# Patient Record
Sex: FEMALE | Race: WHITE | Employment: OTHER | URBAN - METROPOLITAN AREA
[De-identification: names, ages, dates, MRNs, and addresses within clinical notes are randomized per-mention and may not be internally consistent; named-entity substitution may affect disease eponyms.]

---

## 2022-09-23 RX ORDER — ATORVASTATIN CALCIUM 40 MG/1
40 TABLET, FILM COATED ORAL DAILY
COMMUNITY
Start: 2022-03-21

## 2022-09-23 RX ORDER — INSULIN DEGLUDEC 200 U/ML
60 INJECTION, SOLUTION SUBCUTANEOUS DAILY
COMMUNITY
Start: 2022-08-16 | End: 2022-11-14

## 2022-09-23 RX ORDER — VALSARTAN 80 MG/1
TABLET ORAL
COMMUNITY
Start: 2022-09-07 | End: 2022-12-06

## 2022-09-23 RX ORDER — METFORMIN HYDROCHLORIDE 500 MG/1
1000 TABLET, EXTENDED RELEASE ORAL 2 TIMES DAILY
COMMUNITY
Start: 2022-03-14 | End: 2023-03-14

## 2022-09-23 RX ORDER — ASPIRIN 81 MG/1
81 TABLET, CHEWABLE ORAL DAILY
COMMUNITY
Start: 2021-12-30 | End: 2022-12-30

## 2022-09-28 ENCOUNTER — ANESTHESIA EVENT (OUTPATIENT)
Dept: OPERATING ROOM | Age: 77
End: 2022-09-28
Payer: MEDICARE

## 2022-09-28 ENCOUNTER — HOSPITAL ENCOUNTER (OUTPATIENT)
Age: 77
Setting detail: OUTPATIENT SURGERY
Discharge: HOME | End: 2022-09-28
Attending: OPHTHALMOLOGY | Admitting: OPHTHALMOLOGY
Payer: MEDICARE

## 2022-09-28 ENCOUNTER — ANESTHESIA (OUTPATIENT)
Dept: OPERATING ROOM | Age: 77
End: 2022-09-28
Payer: MEDICARE

## 2022-09-28 VITALS
SYSTOLIC BLOOD PRESSURE: 125 MMHG | TEMPERATURE: 97.2 F | HEART RATE: 60 BPM | WEIGHT: 177 LBS | HEIGHT: 67 IN | OXYGEN SATURATION: 97 % | RESPIRATION RATE: 18 BRPM | DIASTOLIC BLOOD PRESSURE: 84 MMHG | BODY MASS INDEX: 27.78 KG/M2

## 2022-09-28 LAB
EKG ATRIAL RATE: 63 BPM
EKG P AXIS: 60 DEGREES
EKG P-R INTERVAL: 146 MS
EKG Q-T INTERVAL: 408 MS
EKG QRS DURATION: 70 MS
EKG QTC CALCULATION (BAZETT): 417 MS
EKG R AXIS: 29 DEGREES
EKG T AXIS: 49 DEGREES
EKG VENTRICULAR RATE: 63 BPM
GFR NON-AFRICAN AMERICAN: 47 ML/MIN
GFR SERPL CREATININE-BSD FRML MDRD: 57 ML/MIN
GFR SERPL CREATININE-BSD FRML MDRD: ABNORMAL ML/MIN/{1.73_M2}
GLUCOSE BLD-MCNC: 103 MG/DL (ref 74–100)
POC BUN: 29 MG/DL (ref 8–26)
POC CREATININE: 1.12 MG/DL (ref 0.51–1.19)

## 2022-09-28 PROCEDURE — 6360000002 HC RX W HCPCS: Performed by: OPHTHALMOLOGY

## 2022-09-28 PROCEDURE — 6370000000 HC RX 637 (ALT 250 FOR IP): Performed by: OPHTHALMOLOGY

## 2022-09-28 PROCEDURE — 3600000004 HC SURGERY LEVEL 4 BASE: Performed by: OPHTHALMOLOGY

## 2022-09-28 PROCEDURE — 82565 ASSAY OF CREATININE: CPT

## 2022-09-28 PROCEDURE — 2500000003 HC RX 250 WO HCPCS

## 2022-09-28 PROCEDURE — 7100000040 HC SPAR PHASE II RECOVERY - FIRST 15 MIN: Performed by: OPHTHALMOLOGY

## 2022-09-28 PROCEDURE — 2709999900 HC NON-CHARGEABLE SUPPLY: Performed by: OPHTHALMOLOGY

## 2022-09-28 PROCEDURE — 3700000001 HC ADD 15 MINUTES (ANESTHESIA): Performed by: OPHTHALMOLOGY

## 2022-09-28 PROCEDURE — 2720000010 HC SURG SUPPLY STERILE: Performed by: OPHTHALMOLOGY

## 2022-09-28 PROCEDURE — 3600000014 HC SURGERY LEVEL 4 ADDTL 15MIN: Performed by: OPHTHALMOLOGY

## 2022-09-28 PROCEDURE — 2580000003 HC RX 258: Performed by: OPHTHALMOLOGY

## 2022-09-28 PROCEDURE — 93010 ELECTROCARDIOGRAM REPORT: CPT | Performed by: INTERNAL MEDICINE

## 2022-09-28 PROCEDURE — 2580000003 HC RX 258

## 2022-09-28 PROCEDURE — 3700000000 HC ANESTHESIA ATTENDED CARE: Performed by: OPHTHALMOLOGY

## 2022-09-28 PROCEDURE — 84520 ASSAY OF UREA NITROGEN: CPT

## 2022-09-28 PROCEDURE — 6360000002 HC RX W HCPCS

## 2022-09-28 PROCEDURE — 93005 ELECTROCARDIOGRAM TRACING: CPT | Performed by: STUDENT IN AN ORGANIZED HEALTH CARE EDUCATION/TRAINING PROGRAM

## 2022-09-28 PROCEDURE — 2500000003 HC RX 250 WO HCPCS: Performed by: OPHTHALMOLOGY

## 2022-09-28 PROCEDURE — 82947 ASSAY GLUCOSE BLOOD QUANT: CPT

## 2022-09-28 PROCEDURE — 7100000041 HC SPAR PHASE II RECOVERY - ADDTL 15 MIN: Performed by: OPHTHALMOLOGY

## 2022-09-28 PROCEDURE — 2580000003 HC RX 258: Performed by: ANESTHESIOLOGY

## 2022-09-28 RX ORDER — ONDANSETRON 2 MG/ML
4 INJECTION INTRAMUSCULAR; INTRAVENOUS
Status: DISCONTINUED | OUTPATIENT
Start: 2022-09-28 | End: 2022-09-28 | Stop reason: HOSPADM

## 2022-09-28 RX ORDER — SODIUM CHLORIDE, SODIUM LACTATE, POTASSIUM CHLORIDE, CALCIUM CHLORIDE 600; 310; 30; 20 MG/100ML; MG/100ML; MG/100ML; MG/100ML
INJECTION, SOLUTION INTRAVENOUS CONTINUOUS
Status: DISCONTINUED | OUTPATIENT
Start: 2022-09-28 | End: 2022-09-28 | Stop reason: HOSPADM

## 2022-09-28 RX ORDER — FENTANYL CITRATE 50 UG/ML
25 INJECTION, SOLUTION INTRAMUSCULAR; INTRAVENOUS EVERY 5 MIN PRN
Status: DISCONTINUED | OUTPATIENT
Start: 2022-09-28 | End: 2022-09-28 | Stop reason: HOSPADM

## 2022-09-28 RX ORDER — SODIUM CHLORIDE 0.9 % (FLUSH) 0.9 %
SYRINGE (ML) INJECTION PRN
Status: DISCONTINUED | OUTPATIENT
Start: 2022-09-28 | End: 2022-09-28 | Stop reason: ALTCHOICE

## 2022-09-28 RX ORDER — MIDAZOLAM HYDROCHLORIDE 2 MG/2ML
1 INJECTION, SOLUTION INTRAMUSCULAR; INTRAVENOUS EVERY 10 MIN PRN
Status: DISCONTINUED | OUTPATIENT
Start: 2022-09-28 | End: 2022-09-28 | Stop reason: HOSPADM

## 2022-09-28 RX ORDER — NEOMYCIN SULFATE, POLYMYXIN B SULFATE, AND DEXAMETHASONE 3.5; 10000; 1 MG/G; [USP'U]/G; MG/G
OINTMENT OPHTHALMIC PRN
Status: DISCONTINUED | OUTPATIENT
Start: 2022-09-28 | End: 2022-09-28 | Stop reason: ALTCHOICE

## 2022-09-28 RX ORDER — LABETALOL HYDROCHLORIDE 5 MG/ML
INJECTION, SOLUTION INTRAVENOUS PRN
Status: DISCONTINUED | OUTPATIENT
Start: 2022-09-28 | End: 2022-09-28

## 2022-09-28 RX ORDER — PHENYLEPHRINE HYDROCHLORIDE 100 MG/ML
1 SOLUTION/ DROPS OPHTHALMIC
Status: COMPLETED | OUTPATIENT
Start: 2022-09-28 | End: 2022-09-28

## 2022-09-28 RX ORDER — SODIUM CHLORIDE 0.9 % (FLUSH) 0.9 %
5-40 SYRINGE (ML) INJECTION EVERY 12 HOURS SCHEDULED
Status: DISCONTINUED | OUTPATIENT
Start: 2022-09-28 | End: 2022-09-28 | Stop reason: HOSPADM

## 2022-09-28 RX ORDER — CYCLOPENTOLATE HYDROCHLORIDE 10 MG/ML
1 SOLUTION/ DROPS OPHTHALMIC
Status: COMPLETED | OUTPATIENT
Start: 2022-09-28 | End: 2022-09-28

## 2022-09-28 RX ORDER — LIDOCAINE HYDROCHLORIDE 10 MG/ML
1 INJECTION, SOLUTION INFILTRATION; PERINEURAL
Status: DISCONTINUED | OUTPATIENT
Start: 2022-09-28 | End: 2022-09-28 | Stop reason: HOSPADM

## 2022-09-28 RX ORDER — FENTANYL CITRATE 50 UG/ML
INJECTION, SOLUTION INTRAMUSCULAR; INTRAVENOUS PRN
Status: DISCONTINUED | OUTPATIENT
Start: 2022-09-28 | End: 2022-09-28 | Stop reason: SDUPTHER

## 2022-09-28 RX ORDER — FENTANYL CITRATE 50 UG/ML
25 INJECTION, SOLUTION INTRAMUSCULAR; INTRAVENOUS
Status: DISCONTINUED | OUTPATIENT
Start: 2022-09-28 | End: 2022-09-28 | Stop reason: HOSPADM

## 2022-09-28 RX ORDER — FENTANYL CITRATE 50 UG/ML
50 INJECTION, SOLUTION INTRAMUSCULAR; INTRAVENOUS EVERY 5 MIN PRN
Status: DISCONTINUED | OUTPATIENT
Start: 2022-09-28 | End: 2022-09-28 | Stop reason: HOSPADM

## 2022-09-28 RX ORDER — DIPHENHYDRAMINE HYDROCHLORIDE 50 MG/ML
12.5 INJECTION INTRAMUSCULAR; INTRAVENOUS
Status: DISCONTINUED | OUTPATIENT
Start: 2022-09-28 | End: 2022-09-28 | Stop reason: HOSPADM

## 2022-09-28 RX ORDER — OFLOXACIN 3 MG/ML
1 SOLUTION/ DROPS OPHTHALMIC
Status: COMPLETED | OUTPATIENT
Start: 2022-09-28 | End: 2022-09-28

## 2022-09-28 RX ORDER — VANCOMYCIN HYDROCHLORIDE 500 MG/10ML
INJECTION, POWDER, LYOPHILIZED, FOR SOLUTION INTRAVENOUS PRN
Status: DISCONTINUED | OUTPATIENT
Start: 2022-09-28 | End: 2022-09-28 | Stop reason: ALTCHOICE

## 2022-09-28 RX ORDER — BALANCED SALT SOLUTION ENRICHED WITH BICARBONATE, DEXTROSE, AND GLUTATHIONE
KIT INTRAOCULAR PRN
Status: DISCONTINUED | OUTPATIENT
Start: 2022-09-28 | End: 2022-09-28 | Stop reason: ALTCHOICE

## 2022-09-28 RX ORDER — LIDOCAINE HYDROCHLORIDE 10 MG/ML
INJECTION, SOLUTION EPIDURAL; INFILTRATION; INTRACAUDAL; PERINEURAL PRN
Status: DISCONTINUED | OUTPATIENT
Start: 2022-09-28 | End: 2022-09-28 | Stop reason: SDUPTHER

## 2022-09-28 RX ORDER — CYCLOPENTOLATE HYDROCHLORIDE 10 MG/ML
SOLUTION/ DROPS OPHTHALMIC PRN
Status: DISCONTINUED | OUTPATIENT
Start: 2022-09-28 | End: 2022-09-28 | Stop reason: ALTCHOICE

## 2022-09-28 RX ORDER — SODIUM CHLORIDE 9 MG/ML
INJECTION, SOLUTION INTRAVENOUS PRN
Status: DISCONTINUED | OUTPATIENT
Start: 2022-09-28 | End: 2022-09-28 | Stop reason: HOSPADM

## 2022-09-28 RX ORDER — SODIUM CHLORIDE 0.9 % (FLUSH) 0.9 %
5-40 SYRINGE (ML) INJECTION PRN
Status: DISCONTINUED | OUTPATIENT
Start: 2022-09-28 | End: 2022-09-28 | Stop reason: HOSPADM

## 2022-09-28 RX ORDER — PROPOFOL 10 MG/ML
INJECTION, EMULSION INTRAVENOUS PRN
Status: DISCONTINUED | OUTPATIENT
Start: 2022-09-28 | End: 2022-09-28 | Stop reason: SDUPTHER

## 2022-09-28 RX ORDER — FENTANYL CITRATE 50 UG/ML
50 INJECTION, SOLUTION INTRAMUSCULAR; INTRAVENOUS
Status: DISCONTINUED | OUTPATIENT
Start: 2022-09-28 | End: 2022-09-28 | Stop reason: HOSPADM

## 2022-09-28 RX ORDER — SODIUM CHLORIDE, SODIUM LACTATE, POTASSIUM CHLORIDE, CALCIUM CHLORIDE 600; 310; 30; 20 MG/100ML; MG/100ML; MG/100ML; MG/100ML
INJECTION, SOLUTION INTRAVENOUS CONTINUOUS PRN
Status: DISCONTINUED | OUTPATIENT
Start: 2022-09-28 | End: 2022-09-28 | Stop reason: SDUPTHER

## 2022-09-28 RX ORDER — LABETALOL HYDROCHLORIDE 5 MG/ML
INJECTION, SOLUTION INTRAVENOUS PRN
Status: DISCONTINUED | OUTPATIENT
Start: 2022-09-28 | End: 2022-09-28 | Stop reason: SDUPTHER

## 2022-09-28 RX ORDER — TROPICAMIDE 10 MG/ML
1 SOLUTION/ DROPS OPHTHALMIC
Status: COMPLETED | OUTPATIENT
Start: 2022-09-28 | End: 2022-09-28

## 2022-09-28 RX ADMIN — OFLOXACIN 1 DROP: 3 SOLUTION OPHTHALMIC at 09:23

## 2022-09-28 RX ADMIN — PHENYLEPHRINE HYDROCHLORIDE 1 DROP: 100 SOLUTION/ DROPS OPHTHALMIC at 09:23

## 2022-09-28 RX ADMIN — SODIUM CHLORIDE, POTASSIUM CHLORIDE, SODIUM LACTATE AND CALCIUM CHLORIDE: 600; 310; 30; 20 INJECTION, SOLUTION INTRAVENOUS at 09:47

## 2022-09-28 RX ADMIN — LIDOCAINE HYDROCHLORIDE 30 MG: 10 INJECTION, SOLUTION EPIDURAL; INFILTRATION; INTRACAUDAL; PERINEURAL at 09:57

## 2022-09-28 RX ADMIN — SODIUM CHLORIDE, POTASSIUM CHLORIDE, SODIUM LACTATE AND CALCIUM CHLORIDE: 600; 310; 30; 20 INJECTION, SOLUTION INTRAVENOUS at 09:36

## 2022-09-28 RX ADMIN — Medication 5 MG: at 10:10

## 2022-09-28 RX ADMIN — CYCLOPENTOLATE HYDROCHLORIDE 1 DROP: 10 SOLUTION/ DROPS OPHTHALMIC at 09:07

## 2022-09-28 RX ADMIN — TROPICAMIDE 1 DROP: 10 SOLUTION/ DROPS OPHTHALMIC at 09:07

## 2022-09-28 RX ADMIN — PHENYLEPHRINE HYDROCHLORIDE 1 DROP: 100 SOLUTION/ DROPS OPHTHALMIC at 09:35

## 2022-09-28 RX ADMIN — PHENYLEPHRINE HYDROCHLORIDE 1 DROP: 100 SOLUTION/ DROPS OPHTHALMIC at 09:07

## 2022-09-28 RX ADMIN — FENTANYL CITRATE 25 MCG: 50 INJECTION, SOLUTION INTRAMUSCULAR; INTRAVENOUS at 10:23

## 2022-09-28 RX ADMIN — OFLOXACIN 1 DROP: 3 SOLUTION OPHTHALMIC at 09:07

## 2022-09-28 RX ADMIN — OFLOXACIN 1 DROP: 3 SOLUTION OPHTHALMIC at 09:35

## 2022-09-28 RX ADMIN — TROPICAMIDE 1 DROP: 10 SOLUTION/ DROPS OPHTHALMIC at 09:35

## 2022-09-28 RX ADMIN — PROPOFOL 30 MG: 10 INJECTION, EMULSION INTRAVENOUS at 09:57

## 2022-09-28 RX ADMIN — CYCLOPENTOLATE HYDROCHLORIDE 1 DROP: 10 SOLUTION/ DROPS OPHTHALMIC at 09:23

## 2022-09-28 RX ADMIN — Medication 5 MG: at 10:22

## 2022-09-28 RX ADMIN — Medication 5 MG: at 10:27

## 2022-09-28 RX ADMIN — CYCLOPENTOLATE HYDROCHLORIDE 1 DROP: 10 SOLUTION/ DROPS OPHTHALMIC at 09:35

## 2022-09-28 RX ADMIN — TROPICAMIDE 1 DROP: 10 SOLUTION/ DROPS OPHTHALMIC at 09:23

## 2022-09-28 NOTE — ANESTHESIA POSTPROCEDURE EVALUATION
Department of Anesthesiology  Postprocedure Note    Patient: Angela Keller  MRN: [id number]  YOB: 1945  Date of evaluation: 9/28/2022      Procedure Summary     Date: 09/28/22 Room / Location: [address]    Anesthesia Start: 1384 Anesthesia Stop: 2945    Procedure: PARS PLANA VITRECTOMY 25 GAUGE, ENDOLASER 200MS 200MW 1125 SPOTS, AIR FLUID EXCHANGE (Left) Diagnosis:       Vitreous hemorrhage of left eye ([address]      (VITREOUS HEMORRHAGE LEFT EYE)    Surgeons: Gurpreet An MD Responsible Provider: William Obrien MD    Anesthesia Type: MAC ASA Status: 3          Anesthesia Type: No value filed.     Jaylene Phase I:      Jaylene Phase II: Jaylene Score: 10      Anesthesia Post Evaluation    Patient location during evaluation: PACU  Patient participation: complete - patient participated  Level of consciousness: awake  Pain score: 1  Airway patency: patent  Nausea & Vomiting: no nausea and no vomiting  Complications: no  Cardiovascular status: blood pressure returned to baseline and hemodynamically stable  Respiratory status: acceptable  Hydration status: euvolemic

## 2022-09-28 NOTE — BRIEF OP NOTE
Brief Postoperative Note      Patient: Karissa Sheldon  YOB: 1945  MRN: [id number]    Date of Procedure: 9/28/2022    Pre-Op Diagnosis: VITREOUS HEMORRHAGE LEFT EYE    Post-Op Diagnosis: Same       Procedure(s):  PARS PLANA VITRECTOMY 25 GAUGE, ENDOLASER 200MS 200MW 1125 SPOTS, AIR FLUID EXCHANGE    Surgeon(s):  Marlon Rosen MD    Assistant:  * No surgical staff found *    Anesthesia: Monitor Anesthesia Care    Estimated Blood Loss (mL): Minimal    Complications: None    Specimens:   * No specimens in log *    Implants:  * No implants in log *      Drains: * No LDAs found *    Findings:     Electronically signed by Marlon Rosen MD on 9/28/2022 at 10:49 AM

## 2022-09-28 NOTE — H&P
History and Physical    Pt Name: Reagan Katz  MRN: [id number]  YOB: 1945  Date of evaluation: 9/28/2022  Primary Care Physician: No primary care provider on file. SUBJECTIVE:   History of Chief Complaint:    Reagan Katz is a 76 y.o. female who is scheduled today for PARS PLANA VITRECTOMY 25 GAUGE, ENDOLASER - Left. Patient is a poor historian but reports she has been seeing blurred vision with glaring lights to her left eye for the last few weeks. Patient denies any symptoms to her right eye. Patient denies any previous eye surgery. Allergies  is allergic to iodine. Medications  Prior to Admission medications    Medication Sig Start Date End Date Taking? Authorizing Provider   aspirin 81 MG chewable tablet Take 81 mg by mouth daily 12/30/21 12/30/22 Yes Historical Provider, MD   atorvastatin (LIPITOR) 40 MG tablet Take 40 mg by mouth daily 3/21/22  Yes Historical Provider, MD   Insulin Degludec (TRESIBA FLEXTOUCH) 200 UNIT/ML SOPN Inject 60 Units into the skin daily 8/16/22 11/14/22 Yes Historical Provider, MD   metFORMIN (GLUCOPHAGE-XR) 500 MG extended release tablet Take 1,000 mg by mouth 2 times daily 3/14/22 3/14/23 Yes Historical Provider, MD   valsartan (DIOVAN) 80 MG tablet Take 1 tablet by mouth once daily 9/7/22 12/6/22 Yes Historical Provider, MD     Past Medical History    has a past medical history of CAD (coronary artery disease), COVID-19, Hyperlipidemia, Hypertension, Hypothyroid, Memory loss, Poor historian, Type II diabetes mellitus ([facility] [address] Under care of team, Under care of team, Wears reading eyeglasses, and Wellness examination. Past Surgical History   has a past surgical history that includes Tonsillectomy; Coronary artery bypass graft; salpingectomy; Appendectomy; Toe amputation (07/16/2018); and Balloon angioplasty, artery (Left, 07/11/2018). Social History   reports that she has never smoked.  She has never used smokeless tobacco.   has no history on file for alcohol use.   has no history on file for drug use. Marital Status   Children none  Occupation retired  Family History  Family Status   Relation Name Status    Mother      Father       family history is not on file. Review of Systems:  CONSTITUTIONAL:   negative for fevers, chills, fatigue and malaise    EYES:   negative for double vision, blurred vision and glaring light to left eye    HEENT:   negative for tinnitus, epistaxis and sore throat     RESPIRATORY:   negative for cough, shortness of breath, wheezing     CARDIOVASCULAR:   negative for chest pain, palpitations, syncope, edema     GASTROINTESTINAL:   negative for nausea, vomiting     GENITOURINARY:   negative for incontinence     MUSCULOSKELETAL:   negative for neck or back pain     NEUROLOGICAL:   Negative for weakness and tingling  negative for headaches and dizziness     PSYCHIATRIC:   negative for anxiety       OBJECTIVE:   VITALS:  height is 5' 7\" (1.702 m) and weight is 177 lb (80.3 kg). Her temporal temperature is 96.8 °F (36 °C). Her blood pressure is 214/87 (abnormal) and her pulse is 70. Her respiration is 22 and oxygen saturation is 98%. CONSTITUTIONAL:alert & oriented x 3, no acute distress. Calm and pleasant. SKIN:  Warm and dry, no rashes to exposed areas of skin. HEAD:  Normocephalic, atraumatic. EYES: Left eye s/p gtts, right eye reactive to light. EOMs intact. EARS:  Intact and equal bilaterally. No edema or thickening, without lumps, lesions, or discharge. Hearing grossly WNL. NOSE:  Nares patent. No rhinorrhea. MOUTH/THROAT:  Mucous membranes pink and moist, uvula midline, many missing teeth, poor dentition. NECK: Supple, no lymphadenopathy. LUNGS: Respirations even and non-labored. Clear to auscultation bilaterally, no wheezes, rales, or rhonchi. CARDIOVASCULAR: Regular rate and rhythm, no murmurs/rubs/gallops. ABDOMEN: soft, non-tender and non-distended, bowel sounds active x 4. EXTREMITIES: 2+ edema to bilateral lower extremities. No varicosities to bilateral lower extremities. NEUROLOGIC: CN II-XII are grossly intact. Gait not assessed. IMPRESSIONS:   Vitreous hemorrhage of left eye. PLANS:   PARS PLANA VITRECTOMY 25 GAUGE, ENDOLASER - Left.     MILENA Ontiveros - CNP   Electronically signed 9/28/2022 at 9:38 AM

## 2022-09-28 NOTE — ANESTHESIA PRE PROCEDURE
Department of Anesthesiology  Preprocedure Note       Name:  Lida Bautista   Age:  76 y.o.  :  1945                                          MRN:  [id number]         Date:  2022      Surgeon: Brittanie Astudillo):  Pradeep Jang MD    Procedure: Procedure(s):  PARS PLANA VITRECTOMY 25 GAUGE, ENDOLASER    Medications prior to admission:   Prior to Admission medications    Medication Sig Start Date End Date Taking? Authorizing Provider   aspirin 81 MG chewable tablet Take 81 mg by mouth daily 21 Yes Historical Provider, MD   atorvastatin (LIPITOR) 40 MG tablet Take 40 mg by mouth daily 3/21/22  Yes Historical Provider, MD   Insulin Degludec (TRESIBA FLEXTOUCH) 200 UNIT/ML SOPN Inject 60 Units into the skin daily 22 Yes Historical Provider, MD   metFORMIN (GLUCOPHAGE-XR) 500 MG extended release tablet Take 1,000 mg by mouth 2 times daily 3/14/22 3/14/23 Yes Historical Provider, MD   valsartan (DIOVAN) 80 MG tablet Take 1 tablet by mouth once daily 22 Yes Historical Provider, MD       Current medications:    Current Facility-Administered Medications   Medication Dose Route Frequency Provider Last Rate Last Admin    ofloxacin (OCUFLOX) 0.3 % solution 1 drop  1 drop Left Eye Q15 Min Pradeep Jang MD        tropicamide (MYDRIACYL) 1 % ophthalmic solution 1 drop  1 drop Left Eye Q15 Min Pradeep Jang MD        phenylephrine (LINDSAY-SYNEPHRINE) 10 % ophthalmic solution 1 drop  1 drop Left Eye Q15 Min Pradeep Jang MD        cyclopentolate (CYCLOGYL) 1 % ophthalmic solution 1 drop  1 drop Left Eye Q15 Min Pradeep Jang MD           Allergies: Allergies   Allergen Reactions    Iodine Itching     Patient states \"I have a rash and red in the face\". Problem List:  There is no problem list on file for this patient.       Past Medical History:        Diagnosis Date    CAD (coronary artery disease)     status post CABG    COVID-19 2021 Myalgia, fatigue, cough X1 week     Loss of taste and smell.  Hyperlipidemia     Hypertension     Hypothyroid     Memory loss 09/26/2022    Son states short term. Pt completes self-care, grocery shops, cooks. Son lives with her and manages her meds and drives her.  Poor historian     Type II diabetes mellitus ([facility] [address]     managed by PCP    Under care of team 09/23/2022    OPTHAMOLOGY - DR. DELGADILLO    Under care of team 09/23/2022    PODIATRY - DR. KRISHNAN-LUAN - last visit 6/2022    Wears reading eyeglasses 09/23/2022    Wellness examination 09/23/2022    [place] - Sukhdev [place] - last visit 8/2022- sees every 3 mos       Past Surgical History:        Procedure Laterality Date    APPENDECTOMY      BALLOON ANGIOPLASTY, ARTERY Left 07/11/2018    peroneal    CORONARY ARTERY BYPASS GRAFT      SALPINGECTOMY      with oophrectomy    TOE AMPUTATION  07/16/2018    lt great toe, 2nd toe.  TONSILLECTOMY      as a child. Social History:    Social History     Tobacco Use    Smoking status: Never    Smokeless tobacco: Never   Substance Use Topics    Alcohol use: Not on file     Comment: NO                                Counseling given: Not Answered      Vital Signs (Current):   Vitals:    09/23/22 1514   Weight: 177 lb (80.3 kg)   Height: 5' 7\" (1.702 m)                                              BP Readings from Last 3 Encounters:   No data found for BP       NPO Status: Time of last liquid consumption: 1900                        Time of last solid consumption: 1900                        Date of last liquid consumption: 09/27/22                        Date of last solid food consumption: 09/27/22    BMI:   Wt Readings from Last 3 Encounters:   09/23/22 177 lb (80.3 kg)     Body mass index is 27.72 kg/m².     CBC: No results found for: WBC, RBC, HGB, HCT, MCV, RDW, PLT    CMP: No results found for: NA, K, CL, CO2, BUN, CREATININE, GFRAA, AGRATIO, LABGLOM, GLUCOSE, GLU, PROT, CALCIUM, BILITOT, ALKPHOS, AST, ALT    POC Tests: No results for input(s): POCGLU, POCNA, POCK, POCCL, POCBUN, POCHEMO, POCHCT in the last 72 hours. Coags: No results found for: PROTIME, INR, APTT    HCG (If Applicable): No results found for: PREGTESTUR, PREGSERUM, HCG, HCGQUANT     ABGs: No results found for: PHART, PO2ART, ONH5ADX, XGP4CFR, BEART, X4WRZVIC     Type & Screen (If Applicable):  No results found for: LABABO, LABRH    Drug/Infectious Status (If Applicable):  No results found for: HIV, HEPCAB    COVID-19 Screening (If Applicable): No results found for: COVID19        Anesthesia Evaluation  Patient summary reviewed no history of anesthetic complications:   Airway: Mallampati: II          Dental:          Pulmonary:Negative Pulmonary ROS and normal exam  breath sounds clear to auscultation                             Cardiovascular:    (+) hypertension:, CAD:, CABG/stent:,         Rhythm: regular  Rate: normal                    Neuro/Psych:   Negative Neuro/Psych ROS              GI/Hepatic/Renal: Neg GI/Hepatic/Renal ROS            Endo/Other:    (+) DiabetesType II DM, , hypothyroidism::., .                 Abdominal:             Vascular: negative vascular ROS. Other Findings:           Anesthesia Plan      MAC     ASA 3       Induction: intravenous. Anesthetic plan and risks discussed with patient. Plan discussed with CRNA. med  ASSESSMENT/PLAN: Gatito Gibson is a 76 y.o. female with:  1. Primary hypertension  Stable, will add farxiga for DM2 and HTN - patient to try [place] and if tolerating will send in 10 mg in combo with metformin. Labs ordered to have done in 3 months prior to next appointment. - CBC and Differential; Future  - Comprehensive Metabolic Panel; Future  - dapagliflozin (Farxiga) 5 mg tablet; Take 1 tablet (5 mg total) by mouth daily for 7 days. Dispense: 7 tablet; Refill: 0    2.  Type 2 diabetes mellitus with other circulatory complication, with long-term current use of insulin (CMS/[facility])  Good improvement. Will add farxiga as above. May need to decrease insulin if getting good results with farxiga and switching to tresiba. Labs ordered to have done in 3 months prior to next appointment.   - POCT glycosylated hemoglobin (Hb A1C)  - Comprehensive Metabolic Panel; Future  - Hemoglobin A1C; Future  - Lipid Panel; Future  - TSH; Future  - T4, Free; Future  - dapagliflozin (Farxiga) 5 mg tablet; Take 1 tablet (5 mg total) by mouth daily for 7 days. Dispense: 7 tablet; Refill: 0    3. CAD, multiple vessel: NO angina S/P CABG  - dapagliflozin (Farxiga) 5 mg tablet; Take 1 tablet (5 mg total) by mouth daily for 7 days. Dispense: 7 tablet; Refill:   Preoperative evaluation to rule out surgical contraindication  Cleared for laser eye surgery.   vvvvvvvvvv        Saba Menezes MD   9/28/2022

## 2022-09-28 NOTE — DISCHARGE INSTRUCTIONS
Going Home Instructions and Medication Schedule  Use one drop in the operated eye(s) at each time marked with a CHECK  If you are using more than one kind of drop, allow three minutes between drops. Use Pain Medication Prescribed by your Surgeon    Positioning    []  Face Down     []  Left Side     []  Right Side     []  Either Side       [x]  Head Elevated on back at 60 deg     []  Right Side Down          Going Home Instructions and Medication Schedule  Use one drop in the operated eye(s) at each time marked with a CHECK  If you are using more than one kind of drop, allow three minutes between drops. Use Pain Medication Prescribed by your Surgeon    Positioning    []  Face Down     []  Left Side     []  Right Side     []  Either Side       []  Head Elevated on back at 30 deg     []  Right Side Down     []  Left Side Down     LOOK DOWN  FOR 24 HRS       When Cleansing the Eye:  1. Wash you hands with soap and water. 2.  Open 2 X 2 dressing and eye patch, tear 6 inches along tape. 3.  Moisten 2 X 2 dressing with sterile irrigating solution. 4.  Gently cleanse eye with one 2 X 2 from inside corner of eye to outside edge. Then        repeat with second 2 X 2. Remember to only use 2 X 2 once. When Giving Eye Drops or Ointments:  1. Cleanse eye as above before giving drops or ointments. 2.  Have the patient look toward the ceiling with both eyes open. 3.  Pull the lower lid down - steady your hand on the patient's forehead. 4.  Put a drop of medicine or a small strip of ointment in the sac behind the       lashes of the lower lid,  5. The tip of the dropper or ointment tube should not touch the eye itself. 6.  Don't give more than one eye medicine at a time. Wait about 3 minutes between           each. 7.  When using both ointment and drops, use the ointment after the drops. 8. If you put in your own drops, it may be easier to lie down.   Ask a friend or relative to     make sure you actually get the medication into the eye. General Instruction:  1. Resume all medications taken before hospitalization. 2.  You may have occasional discomfort after returning home. Take 1 - 2 regular or               extra strength acetaminophen (Tylenol) or other non-aspirin pain relievers every 4 - 6       hours if needed for pain. 3.  If you experience severe pain which is not relieved by the above measures, or if you         develop excessive discharge, please call our office. 4.  For any other problems or questions that arise, please call our office. 5. Bring all post op drops given to you after surgery to your post -op appointment tomorrow . Do not use any drops until you are given instructions  by Dr Natali Chery 's staff tomorrow. Please bring this instruction sheet and your medication with you to your office  Visit.     Name of Drop Cap Color 8:00 AM  Breakfast 12:00 PM  Lunch 6:00 PM  Supper 10:00 PM  Bedtime   Acular/[place] Lennon   []     []         []         []         Alphagan Purple    []        []         []         []         Cosopt White/Blue   []         []         []         []         Econopred Plus [place]   []         []         []         []         Lotemax Pink   []         []         []         []         Lumigan Teal   []         []         []         []         Ocuflox/Ciloxan Mclean   []         []         []         []         Pred G White   []         []         []         []         Timolol/Betimol Yellow   []         []         []         []         Travatan/Xalatan Teal   []         []         []         []         Trusopt Orange   []         []         []         []         Vigamox Mclean   []         []         []         []         ATROPINE Red   []         []         []         []         Ointment    []         []         []         [x]           Additional Instructions and Information:    1.  fI gas was put in your eye during you operation, airplane travel is not permitted until this is approved by your doctor. If you expect to undergo any procedures requiring anesthesia, including dental procedures requiring nitrous gas, notify your doctor                immediately. 2.  If the operated eye is comfortable, you may go without an eye patch. You should wear your regular glasses or sunglasses during the day, which will provide protection for the eye. If you were given an eye shield protector, wear this at night over the operate eye. 3.  Light flashes or other visula symptoms are commin during the post-operative period. Both eyes can be light sensitive and neither condition is cause for alarm. Dark glasses may be helpful in decreasing light sensitivity. Activities May Be Resumed According to the Following Schedule: Activity Time After [facility] or shower with care to keep soap away from eyes Immediately   Shaving Immediately   Careful walking outdoors with a  (weather permitting) Immediately   Reading or watching television Immediately   Riding in a car Immediately   Shampooing hair (head held back _____  Immediately         _____ 2 days   Routine household chores (no scrubbing floors or lifting heavy object)  One Week   Haircut or appointment at beauty shop _____  Immediately         _____One Week   Return to employment Ask Physician   Driving Ask Physician     Any Activity That Makes Marda Polite to the Head May Cause Eye Discomfort: The following activities should be avoided:  A. Lifting over 20 pounds (about a case of soda)  B. Sever physical exertion  C. Bending forward (keep the head upright and bend at the knees  D.   Straining with bowel movement (use mild laxative or sandor softener for constipation)    []  Left Side Down     LOOK DOWN  FOR 24 HRS

## 2022-09-29 NOTE — OP NOTE
[address] [facility]                                OPERATIVE REPORT    PATIENT NAME: Bg Souza                    :        1945  MED REC NO:   [id number]                             ROOM:  ACCOUNT NO:   [de-identified]                           ADMIT DATE: 2022  PROVIDER:     Kayla Burris    DATE OF PROCEDURE:  2022    ATTENDING SURGEON:  Kayla Burris MD    PREOPERATIVE DIAGNOSIS:  Left diabetic vitreous hemorrhage. POSTOPERATIVE DIAGNOSIS:  Left diabetic vitreous hemorrhage. PROCEDURES:  Left pars plana vitrectomy, endolaser, air-fluid exchange. SURGEON:  Kayla Burris MD.    COMPLICATIONS:  None. ANESTHESIA:  MAC. BLOOD LOSS:  Zero. INDICATION FOR SURGERY:  The above patient presented to my office with  complaints of chronic vitreous hemorrhage, left eye, further  observation, multiple Avastin injections. The vision did not improve. She required surgery to improve vision. She can do activities of daily  living. I discussed risks and benefits of surgery in detail with  possibility of recurrent vitreous hemorrhage, infection, retinal  detachment, glaucoma, loss of vision, corneal damage, optic neuropathy,  chronic macular edema, failure of vision to improve due to severe  macular ischemia, retinal detachment, loss of vision, blindness, loss of  eye. The patient understood these risks and was willing to proceed with  surgery. OPERATIVE PROCEDURE:  After informed consent was obtained, the patient  underwent a left retrobulbar block. Total of 8 mL of 2% lidocaine was  used with 0.75% Marcaine in a 50:50 mixture without epinephrine. The  patient's left eye was draped and prepped in the usual sterile fashion. In anticipation for a 25-gauge surgery, Betadine was placed in the  conjunctiva as prophylaxis against infection.   Measuring 3.5 mm  posterior to the limbus in the inferotemporal quadrant, the conjunctiva  was deflected towards the cornea, followed by the infusion cannula  hooked in place. Care was taken to ensure the infusion cannula was seen  in the vitreous cavity prior to turning the infusion cannula on. It was  then turned on to 35. Similar maneuver was done superotemporally and  superonasally by placing trocars 3.5 mm posterior to the limbus. Using  a BIOM lens system, a core vitrectomy was carried out. There was  definite PVD. The posterior hyaloid and the hemorrhage were removed in  a posterior-anterior fashion for 360 degrees. Care was taken to shave  the hemorrhage and the posterior hyaloid out to the vitreous base to 360  degrees. Once this was done, I placed endolaser treatment, but there  was no PRP. PRP was placed to the ora up to the arcades. Once  this was done, peripheral examination and scleral depression did not  show any retinal detachment or dialysis. At this point, an air-fluid  exchange was carried out to prevent postoperative hypotony to reduce  risk of infection. The two superior trocars were removed and one of  them leaked and I closed it with transconjunctival 6-0 gut suture. Infusion cannula was removed. This leak as well was closed with  transconjunctival 6-0 gut suture. Subconjunctival vancomycin was given  superiorly and inferiorly. Antibiotic ointment was placed and the eye  patched and shielded. The patient returned to Recovery in satisfactory  condition.         Pineda Babin    D: 09/28/2022 23:46:04       T: 09/29/2022 1:26:25     [email or web address]  Job#: [id number]     Doc#: [id number]    CC:

## 2023-10-17 ENCOUNTER — APPOINTMENT (OUTPATIENT)
Dept: URBAN - METROPOLITAN AREA CLINIC 227 | Age: 78
Setting detail: DERMATOLOGY
End: 2023-10-17

## (undated) DEVICE — SYRINGE MED 50ML LUERLOCK TIP

## (undated) DEVICE — BLUNTFILL WITH FILTER: Brand: MONOJECT

## (undated) DEVICE — 25 GA FLEXIBLE TIP LASER PROBE: Brand: ALCON, ENGAUGE

## (undated) DEVICE — OCCLUDER EYE POLYETH HYPOALRG ADH TAPE W/O PINHOLE

## (undated) DEVICE — 25GA EZPASS SOFT TIP CANNULA BOX OF 5: Brand: VORTEX SURGICAL INC

## (undated) DEVICE — STANDARD HYPODERMIC NEEDLE,ALUMINUM HUB: Brand: MONOJECT

## (undated) DEVICE — SURGICAL PROCEDURE PACK 25 GA VITRECTOMY

## (undated) DEVICE — RETINA PK

## (undated) DEVICE — SYRINGE, LUER LOCK, 10ML: Brand: MEDLINE

## (undated) DEVICE — SOLUTION SCRB 4OZ 10% POVIDONE IOD ANTIMIC BTL

## (undated) DEVICE — OCCUCOAT SYRINGE 1ML 6PK: Brand: OCCUCOAT

## (undated) DEVICE — SOLUTION PREP POVIDONE IOD FOR SKIN MUCOUS MEM PRIOR TO

## (undated) DEVICE — 1 EACH 40411 STERILE DISPOSABLE SUPER VIEW® LENS SET & 1 EACH 40100 STERILE MICROSCOPE DRAPE: Brand: SUPER VIEW® PACK

## (undated) DEVICE — DRESSING TRNSPAR W2XL2.75IN FLM SHT SEMIPERMEABLE WIND